# Patient Record
Sex: FEMALE | Race: BLACK OR AFRICAN AMERICAN | NOT HISPANIC OR LATINO | URBAN - METROPOLITAN AREA
[De-identification: names, ages, dates, MRNs, and addresses within clinical notes are randomized per-mention and may not be internally consistent; named-entity substitution may affect disease eponyms.]

---

## 2017-10-16 ENCOUNTER — EMERGENCY (EMERGENCY)
Facility: HOSPITAL | Age: 52
LOS: 1 days | Discharge: HOME | End: 2017-10-16
Attending: EMERGENCY MEDICINE | Admitting: EMERGENCY MEDICINE
Payer: COMMERCIAL

## 2017-10-16 VITALS — RESPIRATION RATE: 18 BRPM | SYSTOLIC BLOOD PRESSURE: 136 MMHG | DIASTOLIC BLOOD PRESSURE: 90 MMHG | HEART RATE: 98 BPM

## 2017-10-16 VITALS
SYSTOLIC BLOOD PRESSURE: 134 MMHG | HEART RATE: 78 BPM | OXYGEN SATURATION: 99 % | DIASTOLIC BLOOD PRESSURE: 82 MMHG | RESPIRATION RATE: 17 BRPM

## 2017-10-16 PROCEDURE — 99284 EMERGENCY DEPT VISIT MOD MDM: CPT | Mod: 25

## 2017-10-16 PROCEDURE — 70450 CT HEAD/BRAIN W/O DYE: CPT

## 2017-10-16 PROCEDURE — 72125 CT NECK SPINE W/O DYE: CPT

## 2017-10-16 PROCEDURE — 72125 CT NECK SPINE W/O DYE: CPT | Mod: 26

## 2017-10-16 PROCEDURE — 70450 CT HEAD/BRAIN W/O DYE: CPT | Mod: 26

## 2017-10-16 PROCEDURE — 99284 EMERGENCY DEPT VISIT MOD MDM: CPT

## 2017-10-16 RX ORDER — ACETAMINOPHEN 500 MG
975 TABLET ORAL ONCE
Qty: 0 | Refills: 0 | Status: COMPLETED | OUTPATIENT
Start: 2017-10-16 | End: 2017-10-16

## 2017-10-16 RX ADMIN — Medication 975 MILLIGRAM(S): at 16:10

## 2017-10-16 NOTE — ED PROVIDER NOTE - PHYSICAL EXAMINATION
MSK: midline cervical TTP.  FROM in all extremities.  Compartments soft.  No snuff box TTP.  Sensation intact

## 2017-10-16 NOTE — ED PROVIDER NOTE - PLAN OF CARE
1.  Take Tylenol 650mgs every 4-6 hrs and/or Ibuprofen 600mgs every 6 hrs as needed for pain  2.  Follow up with your PMD in 2-3 days  3.  Return to the ER for worsening headache, blurry vision, weakness, numbness/tingling or any other concerning symptoms

## 2017-10-16 NOTE — ED PROVIDER NOTE - MUSCULOSKELETAL, MLM
Spine appears normal, range of motion is not limited, no muscle or joint tenderness no bony ttp chest, pelvis, 4/4 ext
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
## 2018-02-01 ENCOUNTER — OUTPATIENT (OUTPATIENT)
Dept: OUTPATIENT SERVICES | Facility: HOSPITAL | Age: 53
LOS: 1 days | Discharge: HOME | End: 2018-02-01
Payer: COMMERCIAL

## 2018-02-01 VITALS
HEIGHT: 64 IN | TEMPERATURE: 98 F | SYSTOLIC BLOOD PRESSURE: 104 MMHG | HEART RATE: 66 BPM | DIASTOLIC BLOOD PRESSURE: 75 MMHG | WEIGHT: 203.93 LBS | RESPIRATION RATE: 18 BRPM | OXYGEN SATURATION: 100 %

## 2018-02-01 DIAGNOSIS — Z98.890 OTHER SPECIFIED POSTPROCEDURAL STATES: Chronic | ICD-10-CM

## 2018-02-01 DIAGNOSIS — S83.281D OTHER TEAR OF LATERAL MENISCUS, CURRENT INJURY, RIGHT KNEE, SUBSEQUENT ENCOUNTER: ICD-10-CM

## 2018-02-01 LAB
ANION GAP SERPL CALC-SCNC: 5 MMOL/L — SIGNIFICANT CHANGE UP (ref 5–17)
BASOPHILS # BLD AUTO: 0.1 K/UL — SIGNIFICANT CHANGE UP (ref 0–0.2)
BASOPHILS NFR BLD AUTO: 2 % — SIGNIFICANT CHANGE UP (ref 0–2)
BUN SERPL-MCNC: 10 MG/DL — SIGNIFICANT CHANGE UP (ref 7–23)
CALCIUM SERPL-MCNC: 9.1 MG/DL — SIGNIFICANT CHANGE UP (ref 8.5–10.1)
CHLORIDE SERPL-SCNC: 107 MMOL/L — SIGNIFICANT CHANGE UP (ref 96–108)
CO2 SERPL-SCNC: 28 MMOL/L — SIGNIFICANT CHANGE UP (ref 22–31)
CREAT SERPL-MCNC: 0.62 MG/DL — SIGNIFICANT CHANGE UP (ref 0.5–1.3)
EOSINOPHIL # BLD AUTO: 0.1 K/UL — SIGNIFICANT CHANGE UP (ref 0–0.5)
EOSINOPHIL NFR BLD AUTO: 2.4 % — SIGNIFICANT CHANGE UP (ref 0–6)
GLUCOSE SERPL-MCNC: 98 MG/DL — SIGNIFICANT CHANGE UP (ref 70–99)
HCT VFR BLD CALC: 40.3 % — SIGNIFICANT CHANGE UP (ref 34.5–45)
HGB BLD-MCNC: 13.4 G/DL — SIGNIFICANT CHANGE UP (ref 11.5–15.5)
LYMPHOCYTES # BLD AUTO: 1.9 K/UL — SIGNIFICANT CHANGE UP (ref 1–3.3)
LYMPHOCYTES # BLD AUTO: 41.2 % — SIGNIFICANT CHANGE UP (ref 13–44)
MCHC RBC-ENTMCNC: 27.8 PG — SIGNIFICANT CHANGE UP (ref 27–34)
MCHC RBC-ENTMCNC: 33.2 GM/DL — SIGNIFICANT CHANGE UP (ref 32–36)
MCV RBC AUTO: 84 FL — SIGNIFICANT CHANGE UP (ref 80–100)
MONOCYTES # BLD AUTO: 0.3 K/UL — SIGNIFICANT CHANGE UP (ref 0–0.9)
MONOCYTES NFR BLD AUTO: 7.4 % — SIGNIFICANT CHANGE UP (ref 2–14)
NEUTROPHILS # BLD AUTO: 2.2 K/UL — SIGNIFICANT CHANGE UP (ref 1.8–7.4)
NEUTROPHILS NFR BLD AUTO: 47 % — SIGNIFICANT CHANGE UP (ref 43–77)
PLATELET # BLD AUTO: 301 K/UL — SIGNIFICANT CHANGE UP (ref 150–400)
POTASSIUM SERPL-MCNC: 3.9 MMOL/L — SIGNIFICANT CHANGE UP (ref 3.5–5.3)
POTASSIUM SERPL-SCNC: 3.9 MMOL/L — SIGNIFICANT CHANGE UP (ref 3.5–5.3)
RBC # BLD: 4.8 M/UL — SIGNIFICANT CHANGE UP (ref 3.8–5.2)
RBC # FLD: 12 % — SIGNIFICANT CHANGE UP (ref 10.3–14.5)
SODIUM SERPL-SCNC: 140 MMOL/L — SIGNIFICANT CHANGE UP (ref 135–145)
WBC # BLD: 4.7 K/UL — SIGNIFICANT CHANGE UP (ref 3.8–10.5)
WBC # FLD AUTO: 4.7 K/UL — SIGNIFICANT CHANGE UP (ref 3.8–10.5)

## 2018-02-01 PROCEDURE — 93010 ELECTROCARDIOGRAM REPORT: CPT

## 2018-02-01 NOTE — H&P PST ADULT - PMH
Chronic bilateral low back pain without sciatica    Hyperlipidemia, unspecified hyperlipidemia type  No medications  Obesity    Osteoarthritis of knee, unilateral  right  Tear of lateral meniscus of right knee, current, unspecified tear type, initial encounter

## 2018-02-01 NOTE — H&P PST ADULT - ASSESSMENT
52 years old female present to PST prior to right knee arthroscopy with Dr. Mas  Plan   1. NPO after midnight  2. Use E-Z sponge as directed  3. Drink a quart of extra  fluids the day before your surgery.  4. CBC and BMP sent to lab  5. EKG done
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
## 2018-02-01 NOTE — H&P PST ADULT - FAMILY HISTORY
Mother  Still living? Yes, Estimated age: 61-70  Family history of arthritis and other diseases of the musculoskeletal system and connective tissue, Age at diagnosis: Age Unknown     Father  Still living? No  Family history of heart failure, Age at diagnosis: Age Unknown

## 2018-02-01 NOTE — H&P PST ADULT - NSANTHOSAYNRD_GEN_A_CORE
No. THEE screening performed.  STOP BANG Legend: 0-2 = LOW Risk; 3-4 = INTERMEDIATE Risk; 5-8 = HIGH Risk

## 2018-02-01 NOTE — H&P PST ADULT - TEACHING/LEARNING LEARNING PREFERENCES
computer/internet/audio/skill demonstration/verbal instruction/group instruction/individual instruction/video/pictorial/written material

## 2018-02-08 RX ORDER — OXYCODONE HYDROCHLORIDE 5 MG/1
10 TABLET ORAL EVERY 6 HOURS
Refills: 0 | Status: DISCONTINUED | OUTPATIENT
Start: 2018-02-09 | End: 2018-02-09

## 2018-02-08 RX ORDER — ONDANSETRON 8 MG/1
4 TABLET, FILM COATED ORAL ONCE
Refills: 0 | Status: DISCONTINUED | OUTPATIENT
Start: 2018-02-09 | End: 2018-02-09

## 2018-02-08 RX ORDER — SODIUM CHLORIDE 9 MG/ML
1000 INJECTION, SOLUTION INTRAVENOUS
Refills: 0 | Status: DISCONTINUED | OUTPATIENT
Start: 2018-02-09 | End: 2018-02-09

## 2018-02-08 RX ORDER — FENTANYL CITRATE 50 UG/ML
50 INJECTION INTRAVENOUS
Refills: 0 | Status: DISCONTINUED | OUTPATIENT
Start: 2018-02-09 | End: 2018-02-09

## 2018-02-09 ENCOUNTER — OUTPATIENT (OUTPATIENT)
Dept: OUTPATIENT SERVICES | Facility: HOSPITAL | Age: 53
LOS: 1 days | Discharge: HOME | End: 2018-02-09
Payer: COMMERCIAL

## 2018-02-09 ENCOUNTER — RESULT REVIEW (OUTPATIENT)
Age: 53
End: 2018-02-09

## 2018-02-09 VITALS
HEART RATE: 65 BPM | OXYGEN SATURATION: 100 % | DIASTOLIC BLOOD PRESSURE: 85 MMHG | WEIGHT: 203.93 LBS | HEIGHT: 64 IN | RESPIRATION RATE: 16 BRPM | TEMPERATURE: 98 F | SYSTOLIC BLOOD PRESSURE: 145 MMHG

## 2018-02-09 VITALS
RESPIRATION RATE: 100 BRPM | SYSTOLIC BLOOD PRESSURE: 124 MMHG | TEMPERATURE: 98 F | HEART RATE: 58 BPM | DIASTOLIC BLOOD PRESSURE: 67 MMHG

## 2018-02-09 DIAGNOSIS — Z98.890 OTHER SPECIFIED POSTPROCEDURAL STATES: Chronic | ICD-10-CM

## 2018-02-09 PROCEDURE — 88304 TISSUE EXAM BY PATHOLOGIST: CPT | Mod: 26

## 2018-02-09 RX ORDER — OXYCODONE HYDROCHLORIDE 5 MG/1
1 TABLET ORAL
Qty: 40 | Refills: 0
Start: 2018-02-09

## 2018-02-09 NOTE — BRIEF OPERATIVE NOTE - PROCEDURE
<<-----Click on this checkbox to enter Procedure Knee arthroscopy, right  02/09/2018  partial medial and lateral menisectomy  Active  [id number]

## 2018-02-09 NOTE — ASU DISCHARGE PLAN (ADULT/PEDIATRIC). - MEDICATION SUMMARY - MEDICATIONS TO TAKE
I will START or STAY ON the medications listed below when I get home from the hospital:    Advil 200 mg oral tablet  -- 1 tab(s) by mouth every 6 hours, As Needed  -- Indication: For RIGHT KNEE LATERAL MENISCAL TEAR    acetaminophen-oxyCODONE 325 mg-5 mg oral tablet  -- 1 tab(s) by mouth every 4 hours MDD:6 prn pain  -- Caution federal law prohibits the transfer of this drug to any person other  than the person for whom it was prescribed.  May cause drowsiness.  Alcohol may intensify this effect.  Use care when operating dangerous machinery.  This prescription cannot be refilled.  This product contains acetaminophen.  Do not use  with any other product containing acetaminophen to prevent possible liver damage.  Using more of this medication than prescribed may cause serious breathing problems.    -- Indication: For RIGHT KNEE LATERAL MENISCAL TEAR    Vitamin D3 2000 intl units oral tablet  -- 1 tab(s) by mouth once a day  -- Indication: For Supplement

## 2018-02-09 NOTE — ASU DISCHARGE PLAN (ADULT/PEDIATRIC). - NURSING INSTRUCTIONS
Begin with liquids and light food ( tea, toast, Jello, soups). Advance to what you normally eat. Liquids should taken in adequate amounts today.Refer to multi color post op discharge instruction sheet     CALL the DOCTOR:    -Fever greater than  101F  - Signs  of infection such as : increase pain,swelling,redness,or a bad  smell coming from the wound.  -Excessive amount of bleeding.  - Any pain that appears to be getting worse.  - Vomiting  -  If you have  not urinated 8 hours after surgery or have any difficulty urinating.     A responsible adult should be with you for the rest of the day and night for your safety and to help you if you needed.    Review attached FACT SHEET if applicable. For any problems or concerns,contact your doctor. [facility] patients should call the [facility]. If you cannot reach the doctor or clinic, call [facility] Emergency Department at [phone] or go to your local Emergency Department.  A responsible adult should be with you for the rest of the day and night for your safety and to help you if you needed. Resume your medications as listed on the attached Medication Record.

## 2018-02-14 LAB — SURGICAL PATHOLOGY FINAL REPORT - CH: SIGNIFICANT CHANGE UP

## 2018-02-15 DIAGNOSIS — E78.5 HYPERLIPIDEMIA, UNSPECIFIED: ICD-10-CM

## 2018-02-15 DIAGNOSIS — M17.11 UNILATERAL PRIMARY OSTEOARTHRITIS, RIGHT KNEE: ICD-10-CM

## 2018-02-15 DIAGNOSIS — M23.261 DERANGEMENT OF OTHER LATERAL MENISCUS DUE TO OLD TEAR OR INJURY, RIGHT KNEE: ICD-10-CM

## 2018-02-15 DIAGNOSIS — Z87.891 PERSONAL HISTORY OF NICOTINE DEPENDENCE: ICD-10-CM

## 2018-02-15 DIAGNOSIS — E66.01 MORBID (SEVERE) OBESITY DUE TO EXCESS CALORIES: ICD-10-CM

## 2018-02-15 DIAGNOSIS — M23.231 DERANGEMENT OF OTHER MEDIAL MENISCUS DUE TO OLD TEAR OR INJURY, RIGHT KNEE: ICD-10-CM

## 2019-08-01 ENCOUNTER — RESULT REVIEW (OUTPATIENT)
Age: 54
End: 2019-08-01

## 2019-12-18 ENCOUNTER — APPOINTMENT (OUTPATIENT)
Dept: OBGYN | Facility: CLINIC | Age: 54
End: 2019-12-18

## 2020-03-02 PROBLEM — Z00.00 ENCOUNTER FOR PREVENTIVE HEALTH EXAMINATION: Status: ACTIVE | Noted: 2020-03-02

## 2020-03-02 RX ORDER — IBUPROFEN 200 MG
1 TABLET ORAL
Qty: 0 | Refills: 0 | DISCHARGE

## 2020-03-02 RX ORDER — CHOLECALCIFEROL (VITAMIN D3) 125 MCG
1 CAPSULE ORAL
Qty: 0 | Refills: 0 | DISCHARGE

## 2020-04-02 NOTE — ASU DISCHARGE PLAN (ADULT/PEDIATRIC). - FOR NEXT 12 HOURS DO NOT:
Incoming call [facility] stated [facility] [place]    Statement Selected

## 2020-07-06 PROBLEM — S83.281A OTHER TEAR OF LATERAL MENISCUS, CURRENT INJURY, RIGHT KNEE, INITIAL ENCOUNTER: Chronic | Status: ACTIVE | Noted: 2018-02-01

## 2020-07-06 PROBLEM — E78.5 HYPERLIPIDEMIA, UNSPECIFIED: Chronic | Status: ACTIVE | Noted: 2018-02-01

## 2020-07-06 PROBLEM — M17.10 UNILATERAL PRIMARY OSTEOARTHRITIS, UNSPECIFIED KNEE: Chronic | Status: ACTIVE | Noted: 2018-02-01

## 2020-07-06 PROBLEM — M54.5 LOW BACK PAIN: Chronic | Status: ACTIVE | Noted: 2018-02-01

## 2020-07-06 PROBLEM — E66.9 OBESITY, UNSPECIFIED: Chronic | Status: ACTIVE | Noted: 2018-02-01

## 2020-07-08 ENCOUNTER — APPOINTMENT (OUTPATIENT)
Dept: ORTHOPEDIC SURGERY | Facility: CLINIC | Age: 55
End: 2020-07-08
Payer: COMMERCIAL

## 2020-07-08 VITALS
HEART RATE: 74 BPM | SYSTOLIC BLOOD PRESSURE: 118 MMHG | HEIGHT: 66 IN | DIASTOLIC BLOOD PRESSURE: 81 MMHG | WEIGHT: 208 LBS | BODY MASS INDEX: 33.43 KG/M2

## 2020-07-08 PROCEDURE — 73590 X-RAY EXAM OF LOWER LEG: CPT | Mod: LT

## 2020-07-08 PROCEDURE — 99204 OFFICE O/P NEW MOD 45 MIN: CPT

## 2020-07-08 RX ORDER — DICLOFENAC SODIUM 20 MG/G
2 SOLUTION TOPICAL
Qty: 1 | Refills: 0 | Status: ACTIVE | COMMUNITY
Start: 2020-07-08 | End: 1900-01-01

## 2020-07-08 RX ORDER — MELOXICAM 7.5 MG/1
7.5 TABLET ORAL
Qty: 30 | Refills: 0 | Status: ACTIVE | COMMUNITY
Start: 2020-07-08 | End: 1900-01-01

## 2020-07-08 RX ORDER — DICLOFENAC SODIUM 50 MG/1
50 TABLET, DELAYED RELEASE ORAL
Qty: 60 | Refills: 1 | Status: ACTIVE | COMMUNITY
Start: 2020-07-08 | End: 1900-01-01

## 2020-07-08 NOTE — DISCUSSION/SUMMARY
[de-identified] : Left tibia contusion\par \par \par I discussed the findings and history stem in radiology and recommend conservative measures including a following\par \par Physical therapy\par \par Ice elevate\par \par Compression\par \par The patient was prescribed Diclofenac PO non-steroidal anti-inflammatory medication. 50mg tablets twice daily to be taken for at least 1-2 weeks in a row and then PRN afterwards. Risks and benefits were discussed and include but not limited to renal damage and GI ulceration and bleeding.  They were advised to take with food to limit stomach upset as well as warned to stop the medication if worsening gastric pain or dizziness or other side effects. Also to immediately stop the medication and seek appropriate medical attention if any severe stomach ache, gastritis, black/red vomit, black/red stools or any other medical concern.\par \par The patient was prescribed Diclofenac topical liquid/creme non-steroidal anti-inflammatory medication. 1-2 pumps twice daily and apply to area with pain. There is low systemic absorption of the medication but risks while reduced remain were discussed and include but not limited to renal damage and GI ulceration and bleeding. They were warned to stop the medication if worsening buring skin or gastric pain or dizziness or other side effects. Also to immediately stop the medication and seek appropriate medical attention if any severe stomach ache, gastritis, black/red vomit, black/red stools or any other medical concern.\par \par \par Followup p.r.n.

## 2020-07-08 NOTE — PHYSICAL EXAM
[de-identified] : Physical Examination\par General: well nourished, in no acute distress, alert and oriented to person, place and time\par Psychiatric: normal mood and affect, no abnormal movements or speech patterns\par Eyes: vision intact - glasses\par Throat: no thyromegaly\par Lymph: no enlarged nodes, no lymphedema in extremity\par Respiratory: no wheezing, no shortness of breath with ambulation\par Cardiac: no cardiac leg swelling, 2+ peripheral pulses\par Neurology: normal gross sensation in extremities to light touch\par Abdomen: soft, non-tender, tympanic, no masses\par \par Musculoskeletal Examination\par Ambulation	+ antalgic gait, - assistive devices\par \par Ankle			Right			Left\par General\par 	Deformity       	normal			mild swelling	\par 	Skin/Edema   	normal			normal\par 	Erythema       	-			-\par 	Alignment      	neutral			neutral\par Range of Motion\par 	Ankle Dorsiflex	20			20\par 	Ankle Plantarflex	45			45\par 	Inversion        	15			15\par 	Eversion		5			5\par Drawer\par 	ATFL		-			-\par 	CFL	                	-			-\par 	PTFL		-			-\par Palpation\par 	ATFL		-			-\par 	Medial Heel   	-			-\par 	Other		-			tibial mid distal 1/3\par Strength Examination/Atrophy\par 	EHL 		5+			5+\par 	FHL 		5+			5+\par 	Tibialis Anterior	5+			5+\par 	Achilles/Soleus	5+			5+\par Sensation\par 	Deep Peroneal	normal			normal\par 	Super Peroneal 	normal			normal\par 	Sural 		normal			normal\par 	Posterior Tibial 	normal			normal\par 	Saphenous    	normal			normal\par Pulses\par 	DP   		2+			2+\par \par \par  [de-identified] : 2 views a left tibial\par Ordered taken and by myself today and\par Demonstrate\par No fracture dislocation\par Evidence of lateral joint  space  loss in the knee and medial osteophytes

## 2020-07-08 NOTE — HISTORY OF PRESENT ILLNESS
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 The pain is same, and rated a 6 out of 10, described as sharp burning, with radiation up and down the leg. Rest makes the pain better and activity makes the pain worse. The patient reports associated symptoms of pain. The patient - similar pain previously. \par \par The patient has tried the following treatments:\par Activity modification	+\par Ice/Compression  	+\par Braces    		-\par Nsaids    		+\par Physical Therapy  	-\par Cortisone Injection	-\par Arthroscopy		-\par \par \par Review of Systems is positive for the above musculoskeletal symptoms and is otherwise non-contributory for general, constitutional, psychiatric, neurologic, HEENT, cardiac, respiratory, gastrointestinal, reproductive, lymphatic, and dermatologic complaints.\par \par Consult by Frandy

## 2020-09-11 ENCOUNTER — APPOINTMENT (OUTPATIENT)
Dept: ORTHOPEDIC SURGERY | Facility: CLINIC | Age: 55
End: 2020-09-11
Payer: COMMERCIAL

## 2020-09-11 VITALS
HEIGHT: 65 IN | BODY MASS INDEX: 34.49 KG/M2 | SYSTOLIC BLOOD PRESSURE: 121 MMHG | WEIGHT: 207 LBS | DIASTOLIC BLOOD PRESSURE: 82 MMHG | HEART RATE: 72 BPM | OXYGEN SATURATION: 96 %

## 2020-09-11 PROCEDURE — 99213 OFFICE O/P EST LOW 20 MIN: CPT

## 2020-09-11 NOTE — DISCUSSION/SUMMARY
[de-identified] : Left tibia contusion\par \par \par I discussed the findings and history stem in radiology and recommend conservative measures including a following\par \par Physical therapy\par \par Ice elevate\par \par Compression\par \par prn prescribed Diclofenac PO non-steroidal anti-inflammatory medication. 50mg tablets twice daily to be taken for at least 1-2 weeks in a row and then PRN afterwards. Risks and benefits were discussed and include but not limited to renal damage and GI ulceration and bleeding.  They were advised to take with food to limit stomach upset as well as warned to stop the medication if worsening gastric pain or dizziness or other side effects. Also to immediately stop the medication and seek appropriate medical attention if any severe stomach ache, gastritis, black/red vomit, black/red stools or any other medical concern.\par \par prn prescribed Diclofenac topical liquid/creme non-steroidal anti-inflammatory medication. 1-2 pumps twice daily and apply to area with pain. There is low systemic absorption of the medication but risks while reduced remain were discussed and include but not limited to renal damage and GI ulceration and bleeding. They were warned to stop the medication if worsening buring skin or gastric pain or dizziness or other side effects. Also to immediately stop the medication and seek appropriate medical attention if any severe stomach ache, gastritis, black/red vomit, black/red stools or any other medical concern.\par \par \par Followup p.r.n.

## 2020-09-11 NOTE — HISTORY OF PRESENT ILLNESS
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 The pain is same, and rated a 6 out of 10, described as sharp burning, with radiation up and down the leg. Rest makes the pain better and activity makes the pain worse. The patient reports associated symptoms of pain. The patient - similar pain previously. \par \par The patient has tried the following treatments:\par Activity modification	+\par Ice/Compression  	+\par Braces    		-\par Nsaids    		+\par Physical Therapy  	-\par Cortisone Injection	-\par Arthroscopy		-\par \par pain much better, 10% better\par \par Review of Systems is positive for the above musculoskeletal symptoms and is otherwise non-contributory for general, constitutional, psychiatric, neurologic, HEENT, cardiac, respiratory, gastrointestinal, reproductive, lymphatic, and dermatologic complaints.\par \par Consult by Frandy

## 2020-09-11 NOTE — PHYSICAL EXAM
[de-identified] : Physical Examination\par General: well nourished, in no acute distress, alert and oriented to person, place and time\par Psychiatric: normal mood and affect, no abnormal movements or speech patterns\par Eyes: vision intact - glasses\par Throat: no thyromegaly\par Lymph: no enlarged nodes, no lymphedema in extremity\par Respiratory: no wheezing, no shortness of breath with ambulation\par Cardiac: no cardiac leg swelling, 2+ peripheral pulses\par Neurology: normal gross sensation in extremities to light touch\par Abdomen: soft, non-tender, tympanic, no masses\par \par Musculoskeletal Examination\par Ambulation	+ antalgic gait, - assistive devices\par \par Ankle			Right			Left\par General\par 	Deformity       	normal			mild swelling	\par 	Skin/Edema   	normal			normal\par 	Erythema       	-			-\par 	Alignment      	neutral			neutral\par Range of Motion\par 	Ankle Dorsiflex	20			20\par 	Ankle Plantarflex	45			45\par 	Inversion        	15			15\par 	Eversion		5			5\par Drawer\par 	ATFL		-			-\par 	CFL	                	-			-\par 	PTFL		-			-\par Palpation\par 	ATFL		-			-\par 	Medial Heel   	-			-\par 	Other		-			tibial mid distal 1/3\par Strength Examination/Atrophy\par 	EHL 		5+			5+\par 	FHL 		5+			5+\par 	Tibialis Anterior	5+			5+\par 	Achilles/Soleus	5+			5+\par Sensation\par 	Deep Peroneal	normal			normal\par 	Super Peroneal 	normal			normal\par 	Sural 		normal			normal\par 	Posterior Tibial 	normal			normal\par 	Saphenous    	normal			normal\par Pulses\par 	DP   		2+			2+\par \par \par  [de-identified] : 2 views a left tibial\par 7-8-20\par Demonstrate\par No fracture dislocation\par Evidence of lateral joint  space  loss in the knee and medial osteophytes

## 2021-03-02 ENCOUNTER — APPOINTMENT (OUTPATIENT)
Dept: ORTHOPEDIC SURGERY | Facility: CLINIC | Age: 56
End: 2021-03-02
Payer: COMMERCIAL

## 2021-03-02 VITALS
HEART RATE: 76 BPM | WEIGHT: 208 LBS | HEIGHT: 65 IN | BODY MASS INDEX: 34.66 KG/M2 | DIASTOLIC BLOOD PRESSURE: 89 MMHG | SYSTOLIC BLOOD PRESSURE: 128 MMHG

## 2021-03-02 PROCEDURE — 73590 X-RAY EXAM OF LOWER LEG: CPT | Mod: LT

## 2021-03-02 PROCEDURE — 99072 ADDL SUPL MATRL&STAF TM PHE: CPT

## 2021-03-02 PROCEDURE — 99213 OFFICE O/P EST LOW 20 MIN: CPT

## 2021-03-02 NOTE — PHYSICAL EXAM
[de-identified] : Physical Examination\par General: well nourished, in no acute distress, alert and oriented to person, place and time\par Psychiatric: normal mood and affect, no abnormal movements or speech patterns\par Eyes: vision intact - glasses\par Throat: no thyromegaly\par Lymph: no enlarged nodes, no lymphedema in extremity\par Respiratory: no wheezing, no shortness of breath with ambulation\par Cardiac: no cardiac leg swelling, 2+ peripheral pulses\par Neurology: normal gross sensation in extremities to light touch\par Abdomen: soft, non-tender, tympanic, no masses\par \par Musculoskeletal Examination\par Ambulation	+ antalgic gait, - assistive devices\par \par Ankle			Right			Left\par General\par 	Deformity       	normal			mild swelling	\par 	Skin/Edema   	normal			normal\par 	Erythema       	-			-\par 	Alignment      	neutral			neutral\par Range of Motion\par 	Ankle Dorsiflex	20			20\par 	Ankle Plantarflex	45			45\par 	Inversion        	15			15\par 	Eversion		5			5\par Drawer\par 	ATFL		-			-\par 	CFL	                	-			-\par 	PTFL		-			-\par Palpation\par 	ATFL		-			-\par 	Medial Heel   	-			-\par 	Other		-			tibial mid distal 1/3\par left patella tendon is tender from origin to insertion\par Strength Examination/Atrophy\par 	EHL 		5+			5+\par 	FHL 		5+			5+\par 	Tibialis Anterior	5+			5+\par 	Achilles/Soleus	5+			5+\par Sensation\par 	Deep Peroneal	normal			normal\par 	Super Peroneal 	normal			normal\par 	Sural 		normal			normal\par 	Posterior Tibial 	normal			normal\par 	Saphenous    	normal			normal\par Pulses\par 	DP   		2+			2+\par \par \par  [de-identified] : 2 views a left tibial\par 7-8-20\par Demonstrate\par No fracture dislocation\par Evidence of lateral joint  space  loss in the knee and medial osteophytes\par \par \par 2 views of the left tibia\par Were Ordered, Taken and Evaluated by Myself Today and\par Demonstrate:\par No fracture, no masses noted\par Evidence of lateral joint  space  loss in the knee and medial osteophytes\par \par

## 2021-03-02 NOTE — DISCUSSION/SUMMARY
[de-identified] : Left tibia contusion\par \par \par I discussed the findings and history stem in radiology and recommend conservative measures including a following\par \par Physical therapy\par \par Followup p.r.n.

## 2021-03-02 NOTE — HISTORY OF PRESENT ILLNESS
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 The pain is same, and rated a 6 out of 10, described as sharp burning, with radiation up and down the leg. Rest makes the pain better and activity makes the pain worse. The patient reports associated symptoms of pain. The patient - similar pain previously. \par \par The patient has tried the following treatments:\par Activity modification	+\par Ice/Compression  	+\par Braces    		-\par Nsaids    		+ no help\par Physical Therapy  	+ some help\par Cortisone Injection	-\par Arthroscopy		-\par \par continue w pain, radiating to patella tendon\par \par Review of Systems is positive for the above musculoskeletal symptoms and is otherwise non-contributory for general, constitutional, psychiatric, neurologic, HEENT, cardiac, respiratory, gastrointestinal, reproductive, lymphatic, and dermatologic complaints.\par \par Consult by Frandy

## 2021-08-31 ENCOUNTER — APPOINTMENT (OUTPATIENT)
Dept: ORTHOPEDIC SURGERY | Facility: CLINIC | Age: 56
End: 2021-08-31
Payer: COMMERCIAL

## 2021-08-31 VITALS
DIASTOLIC BLOOD PRESSURE: 77 MMHG | SYSTOLIC BLOOD PRESSURE: 130 MMHG | WEIGHT: 199 LBS | BODY MASS INDEX: 33.15 KG/M2 | HEART RATE: 70 BPM | HEIGHT: 65 IN

## 2021-08-31 DIAGNOSIS — M76.52 PATELLAR TENDINITIS, LEFT KNEE: ICD-10-CM

## 2021-08-31 DIAGNOSIS — S80.12XA CONTUSION OF LEFT LOWER LEG, INITIAL ENCOUNTER: ICD-10-CM

## 2021-08-31 PROCEDURE — 99214 OFFICE O/P EST MOD 30 MIN: CPT

## 2021-08-31 PROCEDURE — 73564 X-RAY EXAM KNEE 4 OR MORE: CPT | Mod: LT

## 2021-08-31 PROCEDURE — 73590 X-RAY EXAM OF LOWER LEG: CPT | Mod: LT

## 2021-08-31 NOTE — DISCUSSION/SUMMARY
[de-identified] : Left tibia contusion\par left knee arthritis lateral compartment\par \par I discussed the findings and history stem in radiology and recommend conservative measures including a following\par \par mri\par \par recommend spine fu\par \par Followup after mri

## 2021-08-31 NOTE — PHYSICAL EXAM
[de-identified] : Physical Examination\par General: well nourished, in no acute distress, alert and oriented to person, place and time\par Psychiatric: normal mood and affect, no abnormal movements or speech patterns\par Eyes: vision intact - glasses\par Throat: no thyromegaly\par Lymph: no enlarged nodes, no lymphedema in extremity\par Respiratory: no wheezing, no shortness of breath with ambulation\par Cardiac: no cardiac leg swelling, 2+ peripheral pulses\par Neurology: normal gross sensation in extremities to light touch\par Abdomen: soft, non-tender, tympanic, no masses\par \par Musculoskeletal Examination\par Ambulation	+ antalgic gait, - assistive devices\par \par Ankle			Right			Left\par General\par 	Deformity       	normal			mild swelling	\par 	Skin/Edema   	normal			normal\par 	Erythema       	-			-\par 	Alignment      	neutral			neutral\par Range of Motion\par 	Ankle Dorsiflex	20			20\par 	Ankle Plantarflex	45			45\par 	Inversion        	15			15\par 	Eversion		5			5\par Drawer\par 	ATFL		-			-\par 	CFL	                	-			-\par 	PTFL		-			-\par Palpation\par 	ATFL		-			-\par 	Medial Heel   	-			-\par 	Other		-			tibial mid distal 1/3\par left patella tendon is tender from origin to insertion\par Strength Examination/Atrophy\par 	EHL 		5+			5+\par 	FHL 		5+			5+\par 	Tibialis Anterior	5+			5+\par 	Achilles/Soleus	5+			5+\par Sensation\par 	Deep Peroneal	normal			normal\par 	Super Peroneal 	normal			normal\par 	Sural 		normal			normal\par 	Posterior Tibial 	normal			normal\par 	Saphenous    	normal			normal\par Pulses\par 	DP   		2+			2+\par \par \par  [de-identified] : 2 views a left tibial\par 7-8-20\par Demonstrate\par No fracture dislocation\par Evidence of lateral joint  space  loss in the knee and medial osteophytes\par \par \par 2 views of the left tibia\par 3-2-21\par Demonstrate:\par No fracture, no masses noted\par Evidence of lateral joint  space  loss in the knee and medial osteophytes\par \par \par \par 2 views of the left tibia and left knee\par Were Ordered, Taken and Evaluated by Myself Today and\par Demonstrate:\par \par Tibia\par No fracture, no masses noted\par \par Knee\par There is severe lateral flex asymmetric narrowing\par Small osteophytic lipping\par Face suprapatellar effusion\par Mild patellofemoral joint space loss without evidence of tilt [or] subluxation on sunrise view\par Normal soft tissue density\par Otherwise normal osseous bone structure without fracture or dislocation\par

## 2021-08-31 NOTE — HISTORY OF PRESENT ILLNESS
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 The pain is same, and rated a 6 out of 10, described as sharp burning, with radiation up and down the leg. Rest makes the pain better and activity makes the pain worse. The patient reports associated symptoms of pain. The patient - similar pain previously. \par \par The patient has tried the following treatments:\par Activity modification	+\par Ice/Compression  	+\par Braces    		-\par Nsaids    		+ no help\par Physical Therapy  	+ some help\par Cortisone Injection	-\par Arthroscopy		-\par \par continue w pain, worsening\par 10/10 all the time worsened by dorsiflex ankle w pain yissel laterlly to knee then lateral leg then buttock and then back, worsens her sciatica\par \par Review of Systems is positive for the above musculoskeletal symptoms and is otherwise non-contributory for general, constitutional, psychiatric, neurologic, HEENT, cardiac, respiratory, gastrointestinal, reproductive, lymphatic, and dermatologic complaints.\par \par Consult by Frandy

## 2021-10-05 ENCOUNTER — RESULT REVIEW (OUTPATIENT)
Age: 56
End: 2021-10-05

## 2021-10-05 ENCOUNTER — OUTPATIENT (OUTPATIENT)
Dept: OUTPATIENT SERVICES | Facility: HOSPITAL | Age: 56
LOS: 1 days | End: 2021-10-05
Payer: COMMERCIAL

## 2021-10-05 ENCOUNTER — APPOINTMENT (OUTPATIENT)
Dept: MRI IMAGING | Facility: CLINIC | Age: 56
End: 2021-10-05
Payer: COMMERCIAL

## 2021-10-05 DIAGNOSIS — Z98.890 OTHER SPECIFIED POSTPROCEDURAL STATES: Chronic | ICD-10-CM

## 2021-10-05 DIAGNOSIS — S80.12XA CONTUSION OF LEFT LOWER LEG, INITIAL ENCOUNTER: ICD-10-CM

## 2021-10-05 PROCEDURE — 73718 MRI LOWER EXTREMITY W/O DYE: CPT | Mod: 26,LT

## 2021-10-05 PROCEDURE — 73718 MRI LOWER EXTREMITY W/O DYE: CPT

## 2021-10-11 ENCOUNTER — NON-APPOINTMENT (OUTPATIENT)
Age: 56
End: 2021-10-11

## 2021-10-29 NOTE — H&P PST ADULT - BLOOD TRANSFUSION, PREVIOUS, PROFILE
SUZANNE BAZZI 67y Male  MRN#: [id number]   CODE STATUS:     Hospital Day: 25d    Pt is currently admitted with the primary diagnosis of variceal bleeding     SUBJECTIVE    Overnight events: No events overnight     Subjective complaints:     Present Today:   - Lorenz:  No [  ], Yes [   ] : Indication:     - Type of IV Access:       .. CVC/Piccline:  No [  ], Yes [   ] : Indication:       .. Midline: No [  ], Yes [   ] : Indication:                                             ----------------------------------------------------------  OBJECTIVE  PAST MEDICAL & SURGICAL HISTORY  HTN (hypertension)    Hepatitis C  2007    Drug abuse    Cancer, hepatocellular  January 2021    COPD, mild                                              -----------------------------------------------------------  ALLERGIES:  No Known Allergies                                            ------------------------------------------------------------    HOME MEDICATIONS  Home Medications:  Eliquis 5 mg oral tablet: 1 tab(s) orally 2 times a day (04 Oct 2021 06:13)  fluticasone-salmeterol 55 mcg-14 mcg/inh inhalation powder: 1 puff(s) inhaled 2 times a day (04 Oct 2021 06:14)  Protonix 40 mg oral delayed release tablet: 1 tab(s) orally once a day (04 Oct 2021 06:13)  spironolactone 50 mg oral tablet: 1 tab(s) orally once a day (04 Oct 2021 06:13)                           MEDICATIONS:  STANDING MEDICATIONS  budesonide  80 MICROgram(s)/formoterol 4.5 MICROgram(s) Inhaler 2 Puff(s) Inhalation two times a day  calcium acetate 667 milliGRAM(s) Oral three times a day with meals  cefTRIAXone   IVPB 1000 milliGRAM(s) IV Intermittent every 24 hours  chlorhexidine 4% Liquid 1 Application(s) Topical daily  dextrose 40% Gel 15 Gram(s) Oral once  dextrose 5%. 1000 milliLiter(s) IV Continuous <Continuous>  dextrose 5%. 1000 milliLiter(s) IV Continuous <Continuous>  dextrose 50% Injectable 25 Gram(s) IV Push once  dextrose 50% Injectable 12.5 Gram(s) IV Push once  dextrose 50% Injectable 25 Gram(s) IV Push once  furosemide    Tablet 20 milliGRAM(s) Oral daily  glucagon  Injectable 1 milliGRAM(s) IntraMuscular once  heparin   Injectable 5000 Unit(s) SubCutaneous every 8 hours  insulin glargine Injectable (LANTUS) 20 Unit(s) SubCutaneous at bedtime  insulin lispro (ADMELOG) corrective regimen sliding scale   SubCutaneous three times a day before meals  insulin lispro Injectable (ADMELOG) 5 Unit(s) SubCutaneous three times a day before meals  lactulose Syrup 15 Gram(s) Oral three times a day  midodrine 10 milliGRAM(s) Oral every 8 hours  nystatin Cream 1 Application(s) Topical two times a day  pantoprazole    Tablet 40 milliGRAM(s) Oral two times a day  propranolol 5 milliGRAM(s) Oral daily  rifAXIMin 550 milliGRAM(s) Oral two times a day    PRN MEDICATIONS                                            ------------------------------------------------------------  VITAL SIGNS: Last 24 Hours  T(C): 35.6 (29 Oct 2021 05:00), Max: 37.1 (28 Oct 2021 20:30)  T(F): 96 (29 Oct 2021 05:00), Max: 98.7 (28 Oct 2021 20:30)  HR: 88 (29 Oct 2021 05:00) (88 - 96)  BP: 112/74 (29 Oct 2021 05:00) (103/73 - 114/66)  BP(mean): --  RR: 18 (29 Oct 2021 05:00) (18 - 18)  SpO2: 98% (28 Oct 2021 20:30) (98% - 98%)      10-28-21 @ 07:01  -  10-29-21 @ 07:00  --------------------------------------------------------  IN: 0 mL / OUT: 300 mL / NET: -300 mL                                             --------------------------------------------------------------  LABS:                        9.9    11.32 )-----------( 136      ( 28 Oct 2021 04:30 )             32.3     10-28    140  |  104  |  61<HH>  ----------------------------<  97  4.7   |  22  |  1.4    Ca    8.4<L>      28 Oct 2021 04:30  Mg     2.1     10-28    TPro  6.3  /  Alb  3.8  /  TBili  1.2  /  DBili  x   /  AST  43<H>  /  ALT  18  /  AlkPhos  169<H>  10-28                                                              -------------------------------------------------------------  RADIOLOGY:                                            --------------------------------------------------------------    PHYSICAL EXAM:  GENERAL: NAD, ill appearing   HEENT:  Atraumatic, Normocephalic. EOMI, PERRLA, conjunctiva and sclera clear, No JVD  PULMONARY: Clear to auscultation bilaterally; No wheeze  CARDIOVASCULAR: Regular rate and rhythm; No murmurs, rubs, or gallops  GASTROINTESTINAL: Distended, ascites, firm abdomen with Denver catheter in place   MUSCULOSKELETAL:  2+ Peripheral Pulses, No clubbing, cyanosis,  3+ pitting edema bilateral, legs wrapped   NEUROLOGY: non-focal  SKIN: No rashes or lesions                                           --------------------------------------------------------------    ASSESSMENT & PLAN  68 yo male, with h/o HTN, drug abuse, Hepatitis C s/p INF, Liver cirrhosis s/p Denver shunt, COPD, DVT? on Eliquis, hepatocellular carcinoma since January 2021 on chemotherapy, presented for weakness and decreased oral intake along with constipation and hematemesis with ammonia (188) on presentation.    Acute gastrointestinal hemorrhage due to variceal bleed  HCC  Decompensated liver cirrhosis due to HCV  Hepatic encephalopathy   Thrombocytopenia  Patient initially presented with worsening hepatic encephalopathy, NH3 on admission 188, hospital course complicated by hematemesis s/p intubation and emergent EGD10/4, s/p EVBL on propranolol   successfully extubated  patient clinically feels well, saturating 99% on 3L, taper down as tolerated   resumed Ceftriaxone for SBP prophylaxis until discharge    repeat Chest xray overall unchanged  seen by s/s, diet advanced   chemotherapy currently on hold given poor functional status, may be considered in the future if the patient improves  patient has a denver catheter due to frequent paracentesis, has had >9L removed (at baseline pt drains 2L Q2days at home),   s/p drainage 10/21 with 2L removed, no SBP on fluid analysis   2-3 L fluid drained on 10/26. Albumin PRN. Patient would like to wait till 10/29 to drain fluid.   do not remove >5L of fluid at a time, give albumin with paracentesis  started lasix 20mg daily for worsening LE edema, monitor renal fxn, discussed with GI  renal fxn slightly up. If continues to uptrend, hold lasix  Palliative team following     EBER: Resolving   - r/o hepatorenal syndrome v abdominal compartment syndrome   metabolic acidosis  Hypernatremia  -avoid nephrotoxics, s/p albumin challenge    -continue phoslo as per renal   - monitor BMP, nephrology following  - stable around 1.4 today     #hyperkalemia: Resolving   - 5.3  - 4.7 today.  - Lokelma if K+ > 5.0      Portal vein thrombosis  Left main and right portal vein thrombus was on eliquis previously  per GI--> tumor thrombus, Gi had discussed with his oncologist that no need to resume eliquis (given the recent variceal bleed)  Revisit this with oncologist in a few days.     Septic shock - resolved   - previously required levophed - d/c'ed on 10/10, now on midodrine  - DTA culture showed Stenotrophomonas, s/p course of levaquin  - monitor BP closely and resume pressors if indicated     COPD - no wheezing on exam       SUZANNE BAZZI 67y Male  MRN#: [id number]   CODE STATUS:     Hospital Day: 25d    Pt is currently admitted with the primary diagnosis of variceal bleeding     SUBJECTIVE    Overnight events: No events overnight     Subjective complaints: He has more abdominal discomfort. Improvement after 3L removal of ascitic fluid. Denies fevers, chills, N/V. Asking to speak to the .     nPresent Today:   - Lorenz:  No [  ], Yes [   ] : Indication:     - Type of IV Access:       .. CVC/Piccline:  No [  ], Yes [   ] : Indication:       .. Midline: No [  ], Yes [   ] : Indication:                                             ----------------------------------------------------------  OBJECTIVE  PAST MEDICAL & SURGICAL HISTORY  HTN (hypertension)    Hepatitis C  2007    Drug abuse    Cancer, hepatocellular  January 2021    COPD, mild                                              -----------------------------------------------------------  ALLERGIES:  No Known Allergies                                            ------------------------------------------------------------    HOME MEDICATIONS  Home Medications:  Eliquis 5 mg oral tablet: 1 tab(s) orally 2 times a day (04 Oct 2021 06:13)  fluticasone-salmeterol 55 mcg-14 mcg/inh inhalation powder: 1 puff(s) inhaled 2 times a day (04 Oct 2021 06:14)  Protonix 40 mg oral delayed release tablet: 1 tab(s) orally once a day (04 Oct 2021 06:13)  spironolactone 50 mg oral tablet: 1 tab(s) orally once a day (04 Oct 2021 06:13)                           MEDICATIONS:  STANDING MEDICATIONS  budesonide  80 MICROgram(s)/formoterol 4.5 MICROgram(s) Inhaler 2 Puff(s) Inhalation two times a day  calcium acetate 667 milliGRAM(s) Oral three times a day with meals  cefTRIAXone   IVPB 1000 milliGRAM(s) IV Intermittent every 24 hours  chlorhexidine 4% Liquid 1 Application(s) Topical daily  dextrose 40% Gel 15 Gram(s) Oral once  dextrose 5%. 1000 milliLiter(s) IV Continuous <Continuous>  dextrose 5%. 1000 milliLiter(s) IV Continuous <Continuous>  dextrose 50% Injectable 25 Gram(s) IV Push once  dextrose 50% Injectable 12.5 Gram(s) IV Push once  dextrose 50% Injectable 25 Gram(s) IV Push once  furosemide    Tablet 20 milliGRAM(s) Oral daily  glucagon  Injectable 1 milliGRAM(s) IntraMuscular once  heparin   Injectable 5000 Unit(s) SubCutaneous every 8 hours  insulin glargine Injectable (LANTUS) 20 Unit(s) SubCutaneous at bedtime  insulin lispro (ADMELOG) corrective regimen sliding scale   SubCutaneous three times a day before meals  insulin lispro Injectable (ADMELOG) 5 Unit(s) SubCutaneous three times a day before meals  lactulose Syrup 15 Gram(s) Oral three times a day  midodrine 10 milliGRAM(s) Oral every 8 hours  nystatin Cream 1 Application(s) Topical two times a day  pantoprazole    Tablet 40 milliGRAM(s) Oral two times a day  propranolol 5 milliGRAM(s) Oral daily  rifAXIMin 550 milliGRAM(s) Oral two times a day    PRN MEDICATIONS                                            ------------------------------------------------------------  VITAL SIGNS: Last 24 Hours  T(C): 35.6 (29 Oct 2021 05:00), Max: 37.1 (28 Oct 2021 20:30)  T(F): 96 (29 Oct 2021 05:00), Max: 98.7 (28 Oct 2021 20:30)  HR: 88 (29 Oct 2021 05:00) (88 - 96)  BP: 112/74 (29 Oct 2021 05:00) (103/73 - 114/66)  BP(mean): --  RR: 18 (29 Oct 2021 05:00) (18 - 18)  SpO2: 98% (28 Oct 2021 20:30) (98% - 98%)      10-28-21 @ 07:01  -  10-29-21 @ 07:00  --------------------------------------------------------  IN: 0 mL / OUT: 300 mL / NET: -300 mL                                             --------------------------------------------------------------  LABS:                        9.9    11.32 )-----------( 136      ( 28 Oct 2021 04:30 )             32.3     10-28    140  |  104  |  61<HH>  ----------------------------<  97  4.7   |  22  |  1.4    Ca    8.4<L>      28 Oct 2021 04:30  Mg     2.1     10-28    TPro  6.3  /  Alb  3.8  /  TBili  1.2  /  DBili  x   /  AST  43<H>  /  ALT  18  /  AlkPhos  169<H>  10-28                                                              -------------------------------------------------------------  RADIOLOGY:                                            --------------------------------------------------------------    PHYSICAL EXAM:  GENERAL: NAD, ill appearing   HEENT:  Atraumatic, Normocephalic. EOMI, PERRLA, conjunctiva and sclera clear, No JVD  PULMONARY: Clear to auscultation bilaterally; No wheeze  CARDIOVASCULAR: Regular rate and rhythm; No murmurs, rubs, or gallops  GASTROINTESTINAL: Distended, ascites, firm abdomen with Denver catheter in place   MUSCULOSKELETAL:  2+ Peripheral Pulses, No clubbing, cyanosis,  3+ pitting edema bilateral, legs wrapped   NEUROLOGY: non-focal  SKIN: No rashes or lesions                                           --------------------------------------------------------------    ASSESSMENT & PLAN  66 yo male, with h/o HTN, drug abuse, Hepatitis C s/p INF, Liver cirrhosis s/p Denver shunt, COPD, DVT? on Eliquis, hepatocellular carcinoma since January 2021 on chemotherapy, presented for weakness and decreased oral intake along with constipation and hematemesis with ammonia (188) on presentation.    Acute gastrointestinal hemorrhage due to variceal bleed  HCC  Decompensated liver cirrhosis due to HCV  Hepatic encephalopathy   Thrombocytopenia  Patient initially presented with worsening hepatic encephalopathy, NH3 on admission 188, hospital course complicated by hematemesis s/p intubation and emergent EGD10/4, s/p EVBL on propranolol   successfully extubated  patient clinically feels well, saturating 99% on 3L, taper down as tolerated   resumed Ceftriaxone for SBP prophylaxis until discharge    repeat Chest xray overall unchanged  seen by s/s, diet advanced   chemotherapy currently on hold given poor functional status, may be considered in the future if the patient improves  patient has a denver catheter due to frequent paracentesis, has had >9L removed (at baseline pt drains 2L Q2days at home),   s/p drainage 10/21 with 2L removed, no SBP on fluid analysis   2-3 L fluid drained on 10/26. Albumin 25G x3 after each drain.   Last drain 10/29: 3L clear straw colored fluid   do not remove >5L of fluid at a time, give albumin with paracentesis  started lasix 20mg daily for worsening LE edema, monitor renal fxn, discussed with GI  renal fxn slightly up. If continues to uptrend, hold lasix  Palliative team following     EBER: Resolving   - r/o hepatorenal syndrome v abdominal compartment syndrome   metabolic acidosis  Hypernatremia  -avoid nephrotoxics, s/p albumin challenge    -continue phoslo as per renal   - monitor BMP, nephrology following  - stable around 1.4 today     #hyperkalemia: Resolving   - 5.3  - 4.7 today.  - Lokelma if K+ > 5.0      Portal vein thrombosis  Left main and right portal vein thrombus was on eliquis previously  per GI--> tumor thrombus, Gi had discussed with his oncologist that no need to resume eliquis (given the recent variceal bleed)  Revisit this with oncologist in a few days.     Septic shock - resolved   - previously required levophed - d/c'ed on 10/10, now on midodrine  - DTA culture showed Stenotrophomonas, s/p course of levaquin  - monitor BP closely and resume pressors if indicated     COPD - no wheezing on exam       no

## 2022-04-09 NOTE — ED ADULT NURSE NOTE - PRIMARY CARE PROVIDER
[facility]  Office: [address], DO, Ritika Jc, DO, Bret Manifold, DO, Dwayne De La O Blood, DO, Nato Garcia MD, Michael Stephens MD, Thu Duque MD, Dick Velasquez MD, Sharri Russo MD, Meaghan Escobar MD, Blanca Mullins MD, Darwin Patton, DO, Angelita Tineo, DO, Louisa Lagunas MD,  Hubert Lambert, DO, Norris Holter, MD, Yesi Hyman MD, Pamela Huggins MD, Patti Henao DO, Donta Brandt MD, Tia Fraser MD, Silva Stanley, CNP, [facility], CNP, Shea Nieto, CNP, oJhn Watson, CNP, Gerard Mckeon, CNP, Jacinda Scherer, CNP, Myranda Mcmahan, CNP, Julio Rodriguez, PA-C, Shilo Reynaga, DNP, Juan Connelly, CNP, Earlene Figueroa, CNP, Treasure Car, CNP, Telly Alert, [facility], Vickie Lou, DNP, Nivia Carroll, CNP, Quincy Castro, CNP, Fely Stafford, [facility]    Progress Note    4/9/2022    2:15 PM    Name:   Corey Martins  MRN:     [id number]     Acct:      [de-identified]   Room:   2009/2009-01  IP Day:  1  Admit Date:  4/8/2022  2:52 AM    PCP:   King Stephen DO  Code Status:  Full Code    Subjective:     C/C:   Chief Complaint   Patient presents with    Altered Mental Status    Urinary Tract Infection     Interval History Status: not changed. Overnight events noted  patient was confused, agitated  Worley was not draining, changed overnight with good urine output  Vitals are stable    Brief History:     78-year-old male with known medical history of chronic urinary retention, on chronic Worley catheter presents to the hospital for altered mental status. As per family patient has been acting more confused for last 2 days. Patient gets his Worley changed every month. Per chart review patient had cloudy urine and was acting very confused. On my evaluation patient is oriented to place and self but not very cooperative in my history and exam.  States that he has pain in the scrotal area.   Has chronic Worley in place. Review of Systems:   Patient is altered, review of system not possible    Medications: Allergies:  No Known Allergies    Current Meds:   Scheduled Meds:    cefepime  2,000 mg IntraVENous Q12H    QUEtiapine  25 mg Oral BID    LORazepam  0.25 mg IntraVENous Once    lidocaine  5 mL Topical Once    latanoprost  1 drop Right Eye Nightly    tamsulosin  0.8 mg Oral Daily    metFORMIN  850 mg Oral BID WC    Vitamin D  2,000 Units Oral Daily    sertraline  25 mg Oral Daily    sodium chloride flush  5-40 mL IntraVENous 2 times per day    enoxaparin  40 mg SubCUTAneous Daily    nicotine  1 patch TransDERmal Daily    finasteride  5 mg Oral Daily     Continuous Infusions:    sodium chloride      sodium chloride Stopped (22 0448)     PRN Meds: potassium chloride **OR** potassium alternative oral replacement **OR** potassium chloride, sodium chloride flush, sodium chloride, ondansetron **OR** ondansetron, acetaminophen **OR** acetaminophen, polyethylene glycol, oxyCODONE-acetaminophen    Data:     Past Medical History:   has a past medical history of Hip fracture ([facility] [address] Social History:   reports that he has been smoking cigarettes. He has a 60.00 pack-year smoking history. He has never used smokeless tobacco. He reports current drug use. Drug: Marijuana Rock Relic). He reports that he does not drink alcohol. Family History:   Family History   Problem Relation Age of Onset    High Blood Pressure Mother     Heart Disease Mother     Kidney Disease Mother     Other Sister         obesity       Vitals:  /79   Pulse 85   Temp 98.1 °F (36.7 °C) (Oral)   Resp 18   Ht 5' 11\" (1.803 m)   Wt 131 lb 3.2 oz (59.5 kg)   SpO2 91%   BMI 18.30 kg/m²   Temp (24hrs), Av °F (36.7 °C), Min:97.8 °F (36.6 °C), Max:98.3 °F (36.8 °C)    No results for input(s): POCGLU in the last 72 hours. I/O (24Hr):     Intake/Output Summary (Last 24 hours) at 2022 1415  Last data filed at 2022 (Principal) UTI (urinary tract infection) 4/8/2022 Yes    Type 2 diabetes mellitus, without long-term current use of insulin ([facility] [address] 4/8/2022 Yes    Essential hypertension 4/8/2022 Yes    Advanced open-angle glaucoma 4/8/2022 Yes    Benign non-nodular prostatic hyperplasia with lower urinary tract symptoms 4/8/2022 Yes    Overview Signed 8/27/2021  1:08 PM by Audi Morelos DO     Formatting of this note might be different from the original.  luts - discussed options. He has cut down some on caffeine but not completely. His symptoms are somewhat improved. He is not bothered by them at this point and because of this does not want any further intervention. Moderate protein-calorie malnutrition ([facility] [address] 4/8/2022 Yes    Toxic metabolic encephalopathy 2/1/4479 Yes    Tobacco abuse 4/8/2022 Yes          Plan:        Toxic metabolic encephalopathy with UTI-UTI likely secondary to chronic Worley, ,urine culture showing gram neg rods, blood cultures negative for now. Mentation is not improving, has intermittent agitation. Received ativan low dose. Will consult neurology. Starting Seroquel 25 mg bid for agitation.     BPH-continue finasteride and Flomax, follows up with urology outpatient     Type 2 diabetes mellitus-on Metformin, continue in the hospital, POCT blood sugar checks at meals and at bedtime.     Hypertension- bp stable, avoid hypotension     Replace potassium    Nicotine patch for tobacco abuse     encourage good oral intake  Sitter at bedside  Pt/ot eval  Discharge planning once mentation improves    Vicky Allen MD  4/9/2022  2:15 PM     Addendum  Discussed with Dr Hugo Vergara, recommend cefepime to be discontinued and watch for response, okay to use ativan for agitation. Will start rocephin instead of cefepime. unk

## 2022-08-07 NOTE — ED ADULT NURSE NOTE - FINAL NURSING ELECTRONIC SIGNATURE
Patient is resting comfortably with no signs of distress, breathing is even and unlabored     Fitz Machado RN  08/07/22 [id number] 16-Oct-2017 17:19

## 2022-10-14 ENCOUNTER — TRANSCRIPTION ENCOUNTER (OUTPATIENT)
Age: 57
End: 2022-10-14

## 2022-10-14 ENCOUNTER — APPOINTMENT (OUTPATIENT)
Dept: ORTHOPEDIC SURGERY | Facility: CLINIC | Age: 57
End: 2022-10-14

## 2022-10-14 VITALS
BODY MASS INDEX: 32.65 KG/M2 | DIASTOLIC BLOOD PRESSURE: 84 MMHG | WEIGHT: 196 LBS | HEART RATE: 75 BPM | HEIGHT: 65 IN | SYSTOLIC BLOOD PRESSURE: 131 MMHG

## 2022-10-14 DIAGNOSIS — M25.552 PAIN IN RIGHT HIP: ICD-10-CM

## 2022-10-14 DIAGNOSIS — M25.561 PAIN IN RIGHT KNEE: ICD-10-CM

## 2022-10-14 DIAGNOSIS — M54.50 LOW BACK PAIN, UNSPECIFIED: ICD-10-CM

## 2022-10-14 DIAGNOSIS — M25.551 PAIN IN RIGHT HIP: ICD-10-CM

## 2022-10-14 DIAGNOSIS — M25.562 PAIN IN RIGHT KNEE: ICD-10-CM

## 2022-10-14 PROCEDURE — 73564 X-RAY EXAM KNEE 4 OR MORE: CPT | Mod: 50

## 2022-10-14 PROCEDURE — 72170 X-RAY EXAM OF PELVIS: CPT

## 2022-10-14 PROCEDURE — 20610 DRAIN/INJ JOINT/BURSA W/O US: CPT

## 2022-10-14 PROCEDURE — 72100 X-RAY EXAM L-S SPINE 2/3 VWS: CPT

## 2022-10-14 PROCEDURE — 99214 OFFICE O/P EST MOD 30 MIN: CPT | Mod: 25

## 2022-10-17 NOTE — DISCUSSION/SUMMARY
[de-identified] : Ms. Baker is a 57-year-old female who presented to the office for evaluation of her bilateral knee pain.  Patient has been experiencing bilateral knee pain for over 1 year.  Pain is worse with activities and ambulation.  She has tried physical therapy and anti-inflammatories for the pain.  XRay showed severe bilateral knee lateral compartment osteoarthritis, as well as mild bilateral hip osteoarthritis and lumbar spine to space narrowing.  Examination showed reduced range of motion with the right knee, mild groin pain with hip range of motion and mild lumbar paraspinal tenderness.  Discussed the operative and nonoperative management of patient's bilateral knee osteoarthritis, including total knee arthroplasty.  Patient would like to continue with nonoperative management at this time.  Discussed the nonoperative management of knee osteoarthritis including, physical therapy, anti-inflammatories, and injections.  Patient is unable to continue physical therapy at this time due to her insurance, but will start again in the new year.  She will continue her home exercises.  She has tried anti-inflammatories and will continue her home regimen of Tylenol and Advil.  Patient would like corticosteroid injections today.  Discussed the risks and benefits of corticosteroid injections with the patient.  Bilateral knee corticosteroid injections were given using aseptic technique.  Patient tolerated the procedure well.  Patient will follow-up in 3 months for reevaluation and management.  Patient understanding and agreement with the plan.  All questions answered.\par \par Plan:\par - Bilateral Knee Corticosteroid Injections\par - Continue anti-inflammatory regimen\par - Continue home exercises for knee range of motion and strengthening\par - Restart physical therapy in the new year.\par - Follow up in 3 months for reevaluation and management\par \par Procedure Note: Bilateral Knee Corticosteroid Injections\par \par An injection was given today. Risk and benefits of the injection were discussed, including but not limited to infection, fat atrophy, skin discoloration, failure to achieve desired results and elevated blood sugars. \par The bilateral knees were prepped in the usual sterile fashion. The injections were given with 1 cc (40mg)  Methylprednisolone and 3 cc 2% Lidocaine and the patient tolerated it well.\par \par Risk of cortisone injection are minimal but present. There is the rare risk of joint infection, skin and soft tissue thinning or whitening of the skin surrounding the injection site, thinning of nearby bone, or the risk of elevated blood glucose in diabetics. Diabetics receiving steroid injection should follow their blood sugars very closely for several days after receiving the injections.  In the event of uncontrolled elevated blood glucose, they should seek medical attention.\par \par There's some concern that repeated use of cortisone shots may cause deterioration of the cartilage within a joint. For this reason, doctors typically limit the number of cortisone shots in a joint. The limit varies depending on the joint and the reason for treatment.\par \par Cortisone shots usually include a corticosteroid medication and a local anesthetic. The local anesthetic helps to numb the joint at the time of the injection and last for several hours. The cortisone acts to relieve pain and inflammation but may take several days to begin to work. Some people do experience a cortisone flare after the injection and may have increased pain for 2 to 3 days after the injection, and then pain can begin to improve.\par \par Patient was instructed to call or return for any signs or symptoms of infection after an injection including increased pain, swelling, redness or fevers.\par

## 2022-10-17 NOTE — PHYSICAL EXAM
[Antalgic] : antalgic [de-identified] : Constitutional:  57 year old female, alert and oriented, cooperative, in no acute distress.\par \par HEENT \par NC/AT.  Appearance: symmetric\par \par Back\par Straight without deformity or instability.  Good ROM.\par \par Chest/Respiratory \par Respiratory effort: no intercostal retractions or use of accessory muscles. Nonlabored Breathing\par \par Skin \par On inspection, warm and dry without rashes or lesions.\par \par Mental Status: \par Judgment, insight: intact\par Orientation: oriented to time, place, and person\par \par Neurological:\par Sensory and Motor are grossly intact throughout\par \par Left Knee\par \par Inspection:\par     Skin intact, no rashes or lesions\par     No Effusion\par     Non-tender to palpation over tibial tubercle, patella, medial and lateral joint line, and pes insertion.\par \par Range of Motion:\par 	Extension - 0 degrees\par 	Flexion - 120 degrees\par 	Alignment - Valgus 3 degrees\par 	Extensor lag: None\par \par Stability:\par      Demonstrates no Varus or Valgus instability\par      Negative Anterior or Posterior drawer.\par      Negative Lachman's\par \par Patella: stable, tracks well. \par \par Right Knee\par \par Inspection:\par     Skin intact, no rashes or lesions\par     No Effusion\par     Non-tender to palpation over tibial tubercle, patella, medial and lateral joint line, and pes insertion.\par \par Range of Motion:\par 	Extension - -3 degrees\par 	Flexion - 110 degrees\par 	Alignment - Valgus 3 degrees\par 	Extensor lag: None\par 	Crepitation:  Positive \par \par Stability:\par      Demonstrates no Varus or Valgus instability\par      Negative Anterior or Posterior drawer.\par      Negative Lachman's\par \par Patella: stable, tracks well. \par \par Right Hip Exam:\par \par Tenderness: \par 	Sacroiliac:  Negative\par 	Greater trochanter: Negative \par \par Range of Motion:\par                 Extension - 0 \par 	Flexion - 110 \par 	IR - 30\par 	ER - 30 \par 	Abd - 45 \par 	Add - 30 \par \par Left Hip Exam: \par \par Tenderness: \par 	Sacroiliac:  Negative\par 	Greater trochanter: Negative \par \par Range of Motion: Mild groin pain with range of motion of left hip\par                 Extension - 0 \par 	Flexion - 110 \par 	IR - 30\par 	ER - 30 \par 	Abd - 45 \par 	Add - 30 \par \par Gait: antalgic\par \par Neurologic Exam\par     Motor intact including 5/5 Extensor Hallucis Longus, 5/5 Flexor Hallucis Longus, 5/5 Tibialis Anterior and 5/5 Gastrocnemius\par     Sensation Intact to Light Touch including Saphenous, Sural, Superficial Peroneal, Deep Peroneal, Tibial nerve distributions\par \par Vascular Exam\par    Bilateral Feet are warm and well perfused with 2+ Dorsalis Pedis Pulse \par \par Spine Exam:\par Mild paraspinal muscle Tenderness over lumbar paraspinal muscles\par Negative Straight Leg Raise\par \par Motor: \par \par                L2               L3               L4               L5               S1\par R            5/5              5/5              5/5               5/5              5/5\par L            5/5              5/5              5/5               5/5              5/5\par \par Sensory:\par                L2          L3          L4           L5          S1        (0=absent, 1=impaired, 2=normal, NT=not testable)\par R              2            2             2            2            2\par L              2            2             2            2            2  [de-identified] : XRay: XRays of the Right Knee (4 Views) knee taken in the office today and reviewed with the patient. XRays demonstrate bone on bone articulations in the lateral compartment with subchondral sclerosis, overlying osteophytes, all consistent with osteoarthritis, KL Grade: 3. (my personal interpretation)\par \par XRay: XRays of the Left Knee (4 Views) knee taken in the office today and reviewed with the patient. XRays demonstrate bone on bone articulations in the lateral compartment with subchondral sclerosis, overlying osteophytes, all consistent with osteoarthritis, KL Grade: 3. (my personal interpretation)\par \par XRay:  XRays of the Pelvis (1 View) taken in the office today and discussed with the patient. XRays demonstrate no obvious fracture or dislocation. There is joint space narrowing in the bilateral hips, consistent with mild osteoarthritis. (my personal interpretation).\par  \par XRay:  XRays of the Lumbar Spine (2 Views) taken in the office today and discussed with the patient. XRays demonstrate no obvious fracture or dislocation. There is disc space narrowing at L5-S1 and L3-L4. (my personal interpretation).

## 2022-10-17 NOTE — REVIEW OF SYSTEMS
[Joint Pain] : joint pain [Joint Stiffness] : no joint stiffness [Joint Swelling] : no joint swelling [FreeTextEntry5] : No chest pain [FreeTextEntry6] : No shortness of breath [FreeTextEntry7] : No stomach issues or ulcers [FreeTextEntry8] : No kidney issues [de-identified] : No fevers or chills [de-identified] : No numbness or tingling [de-identified] : No diabetes

## 2022-10-17 NOTE — HISTORY OF PRESENT ILLNESS
[de-identified] : Ms. Baker is a 57-year-old female who presented to the office for evaluation of her bilateral knee pain.  Patient has been experiencing bilateral knee pain (right greater than left) for more than 1 year.  Pain is worse when raising from a seated position and hurts more to sit than for her to stand.  Patient's knees feel stiff when sitting.  Pain on her right knee is around her joint line while pain on the left knee goes throughout the knee.  Patient has tried physical therapy for about 1.5 years, with some relief, but had to stop for the year due to insurance. She does exercises and stretching at home.  She has tried Tylenol and Advil with some relief.  Patient has also tried Meloxicam, Celebrex, and Diclofenac, without significant relief.  She did not react well to Diclofenac.  She has not tried injections.  Patient had a fall about 1 year ago, with a left tibial contusion that is now mostly resolved.  No fevers or chills.  No significant knee swelling.  Patient does report occasional groin pain and a burning sensation around her sacrum.  No numbness or tingling.  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

## 2023-02-03 ENCOUNTER — APPOINTMENT (OUTPATIENT)
Dept: ORTHOPEDIC SURGERY | Facility: CLINIC | Age: 58
End: 2023-02-03
Payer: COMMERCIAL

## 2023-02-03 VITALS
WEIGHT: 197 LBS | SYSTOLIC BLOOD PRESSURE: 125 MMHG | DIASTOLIC BLOOD PRESSURE: 85 MMHG | HEART RATE: 70 BPM | BODY MASS INDEX: 32.82 KG/M2 | HEIGHT: 65 IN

## 2023-02-03 DIAGNOSIS — M17.0 BILATERAL PRIMARY OSTEOARTHRITIS OF KNEE: ICD-10-CM

## 2023-02-03 PROCEDURE — 99213 OFFICE O/P EST LOW 20 MIN: CPT

## 2023-02-03 PROCEDURE — 73564 X-RAY EXAM KNEE 4 OR MORE: CPT | Mod: 50

## 2023-02-05 NOTE — PHYSICAL EXAM
[Antalgic] : antalgic [de-identified] : Constitutional: 57 year old female, alert and oriented, cooperative, in no acute distress.\par \par HEENT \par NC/AT.  Appearance: symmetric\par \par Neck/Back\par Straight without deformity or instability.  Good ROM.\par \par Chest/Respiratory \par Respiratory effort: no intercostal retractions or use of accessory muscles. Nonlabored Breathing\par \par Skin \par On inspection, warm and dry without rashes or lesions.\par \par Mental Status: \par Judgment, insight: intact\par Orientation: oriented to time, place, and person\par \par Neurological:\par Sensory and Motor are grossly intact throughout\par \par Left Knee\par \par Inspection:\par     Skin intact, no rashes or lesions\par     No Effusion\par     Non-tender to palpation over tibial tubercle, patella, medial joint line, and pes insertion.\par     Tenderness over the lateral joint line at the midcoronal line and over the posterolateral knee\par \par Range of Motion:\par 	Extension - 0 degrees\par 	Flexion - 120 degrees\par 	Alignment - Valgus 5 degrees (correctable)\par 	Extensor lag: None\par \par Stability:\par      Demonstrates no Varus or Valgus instability. Can feel shifting of femur on tibia with valgus stress\par      Negative Anterior or Posterior drawer.\par      Negative Lachman's\par      Negative McMurrays\par \par Patella: stable, tracks well. \par \par Right Knee\par \par Inspection:\par     Skin intact, no rashes or lesions\par     No Effusion\par     Non-tender to palpation over tibial tubercle, patella, medial and lateral joint line, and pes insertion.\par \par Range of Motion:\par 	Extension - 0 degrees\par 	Flexion - 120 degrees\par 	Alignment - Valgus 5 degrees (correctable to 3 degrees)\par 	Extensor lag: None\par \par Stability:\par      Demonstrates no Varus or Valgus instability. Can feel shifting of femur on tibia with valgus stress\par      Negative Anterior or Posterior drawer.\par      Negative Lachman's\par      Positive McMurrays (lateral knee pain)\par \par Patella: stable, tracks well. \par \par Neurologic Exam\par     Motor intact including 5/5 Extensor Hallucis Longus, 5/5 Flexor Hallucis Longus, 5/5 Tibialis Anterior and 5/5 Gastrocnemius\par     Sensation Intact to Light Touch including Saphenous, Sural, Superficial Peroneal, Deep Peroneal, Tibial nerve distributions\par \par Vascular Exam\par     Foot is warm and well perfused with 2+ Dorsalis Pedis Pulse \par \par No pain with range of motion of the bilateral hips. No lumbar paraspinal muscle tenderness. \par \par Spine Exam:\par No midline Tenderness to Palpation over the Lumbar spine\par No paraspinal muscle Tenderness to Palpation\par Negative Straight Leg Raise\par \par Motor: \par \par                L2               L3               L4               L5               S1\par R            5/5              5/5              5/5               5/5              5/5\par L            5/5              5/5              5/5               5/5              5/5\par \par Sensory:\par                L2          L3          L4           L5          S1        (0=absent, 1=impaired, 2=normal, NT=not testable)\par R              2            2             2            2            2\par L              2            2             2            2            2  [de-identified] : XRay: XRays of the Left Knee (4 Views) taken in the office today and reviewed with the patient. XRays demonstrate tricompartmental joint space narrowing, with bone on bone articulations in the lateral compartment, with subchondral sclerosis, overlying osteophytes, all consistent with severe osteoarthritis, KL thGthrthathdtheth:th th5th. There is valgus alignment. (my personal interpretation) \par \par XRay: XRays of the Right Knee (4 Views) taken in the office today and reviewed with the patient. XRays demonstrate tricompartmental joint space narrowing, worse in the lateral compartment with subchondral sclerosis, overlying osteophytes, all consistent with severe osteoarthritis, KL ndGndrndanddndend:nd nd2nd. There is valgus alignment. (my personal interpretation)\par \par XRay:  XRays of the Pelvis (1 View) taken on 10/14/2022.  XRays demonstrate no obvious fracture or dislocation. There is joint space narrowing in the bilateral hips, consistent with mild osteoarthritis. (my personal interpretation).\par  \par XRay:  XRays of the Lumbar Spine (2 Views) taken on 10/14/2022.  XRays demonstrate no obvious fracture or dislocation. There is disc space narrowing at L5-S1 and L3-L4. (my personal interpretation).

## 2023-02-05 NOTE — DISCUSSION/SUMMARY
[de-identified] : Ms. Baker is a 57-year-old female presents to the office for follow-up of her bilateral knee pain.  Patient has a longstanding history of bilateral knee pain and was previously seen in the office.  She was diagnosed with bilateral knee osteoarthritis.  Patient received bilateral knee corticosteroid injections.  Patient states that steroid injections have significantly improved her pain.  She only has occasional pain at this time.  X-rays showed severe bilateral knee osteoarthritis.  Examination showed some bilateral knee laxity, valgus alignment, and some tenderness over the left knee.  There is otherwise good knee range of motion. Discussed with the patient the examination and imaging findings.  Discussed with the patient the operative and nonoperative management of knee osteoarthritis, including total knee arthroplasty.  Patient would like to continue nonoperative management at this time.  Discussed the nonoperative management of knee osteoarthritis, including physical therapy, anti-inflammatories, and injections.  Patient was given referral for physical therapy.  Patient will take Tylenol or over-the-counter anti-inflammatories as needed.  Patient would like to follow-up with a spine specialist for evaluation of her chronic lumbar back pain.  Patient was given referral to spine surgery. Patient will follow-up in 3 months for reevaluation and management, consideration of repeat injection versus total knee arthroplasty..  Patient understanding and in agreement the plan.  All questions answered \par \par \par Plan:\par -Physical therapy for bilateral knee pain\par -Tylenol or over-the-counter anti-inflammatories as needed\par -Follow-up with spine surgery for further evaluation of chronic lumbar back pain\par -Follow-up in 3 months for reevaluation and management, consideration of repeat injection versus total knee arthroplasty

## 2025-01-31 NOTE — H&P PST ADULT - TEMPERATURE IN CELSIUS (DEGREES C)
[facility] order placed.     RN contacted [facility] and spoke with dilip Teague tech.    Inquired of Zahida if [facility] lab could be run with blood received by lab on 1/29/2025.     Zahida will inquire and call RN back in the Allergy Office once confirmed that lab can be completed with blood already received.   
Dr. Ceja called Allergy Office.     Verbal order given to RN to place [facility] lab order.     After ordering lab, call [facility] and ask that lab be run with specimen obtained earlier this week.     Dr. Ceja would like RN to call patient to inform her when above is completed, and advise patient to present to Urgent Care for Prednisone Taper to treat hives.   
RN called [place] [facility] #[phone] to verify All[facility] region 8 lab was added from blood drawn on 1/29/25.     Per Dr. Ceja patient contacted and told [facility] 8 was added to blood drawn previously. Per Dr. Ceja she may now present to urgent care for evaluation and prednisone prescription.     Patient denied difficulty breathing or swallowing. Denied edema of face. Hives present.    Patient declined going to urgent care and states she is calling her primary care for prednisone prescription.    Patient advised to go to ER if she develops difficulty breathing or swallowing, develops edema of face, lips, throat or tongue. Patient verbalized understanding of information.   
36.4

## 2025-03-06 NOTE — H&P PST ADULT - TEACHING/LEARNING EDUCATIONAL LEVEL
Pt notes mild numbness at  site since she had her last one done about 4 years ago. She has noted mild pain with exertion recently. I suspect that the recent pain could be due the pt's recent increase in her activity level. She denies any abnormal vaginal bleeding or discharge at this time and has regular periods monthly that last about 5 days with no intermenstrual bleeding. Recommended taking Tylenol/Ibuprofen as needed, icing the area, and daily stretching. I anticipate that this will improve as her body gets used to her new activity level. Pt will follow up with OB/GYN as well.   [facility]

## 2025-08-15 ENCOUNTER — APPOINTMENT (OUTPATIENT)
Dept: ORTHOPEDIC SURGERY | Facility: CLINIC | Age: 60
End: 2025-08-15
Payer: COMMERCIAL

## 2025-08-15 VITALS — HEIGHT: 65 IN | BODY MASS INDEX: 32.82 KG/M2 | WEIGHT: 197 LBS

## 2025-08-15 DIAGNOSIS — M17.0 BILATERAL PRIMARY OSTEOARTHRITIS OF KNEE: ICD-10-CM

## 2025-08-15 PROCEDURE — 99214 OFFICE O/P EST MOD 30 MIN: CPT | Mod: 25

## 2025-08-15 PROCEDURE — 20610 DRAIN/INJ JOINT/BURSA W/O US: CPT | Mod: 50

## 2025-08-15 PROCEDURE — 73564 X-RAY EXAM KNEE 4 OR MORE: CPT | Mod: 50
